# Patient Record
Sex: FEMALE | Race: WHITE | Employment: FULL TIME | URBAN - METROPOLITAN AREA
[De-identification: names, ages, dates, MRNs, and addresses within clinical notes are randomized per-mention and may not be internally consistent; named-entity substitution may affect disease eponyms.]

---

## 2017-03-17 ENCOUNTER — TRANSFERRED RECORDS (OUTPATIENT)
Dept: HEALTH INFORMATION MANAGEMENT | Facility: CLINIC | Age: 47
End: 2017-03-17

## 2017-03-17 LAB
CHOLEST SERPL-MCNC: 210 MG/DL (ref 100–199)
HDLC SERPL-MCNC: 68 MG/DL
LDLC SERPL CALC-MCNC: 119 MG/DL
NONHDLC SERPL-MCNC: 142 MG/DL
PAP-ABSTRACT: NORMAL
TRIGL SERPL-MCNC: 116 MG/DL

## 2017-09-17 ENCOUNTER — HEALTH MAINTENANCE LETTER (OUTPATIENT)
Age: 47
End: 2017-09-17

## 2018-10-21 ENCOUNTER — HOSPITAL ENCOUNTER (EMERGENCY)
Facility: CLINIC | Age: 48
Discharge: HOME | End: 2018-10-21
Attending: FAMILY MEDICINE | Admitting: FAMILY MEDICINE
Payer: COMMERCIAL

## 2018-10-21 ENCOUNTER — NURSE TRIAGE (OUTPATIENT)
Dept: NURSING | Facility: CLINIC | Age: 48
End: 2018-10-21

## 2018-10-21 VITALS
WEIGHT: 115 LBS | SYSTOLIC BLOOD PRESSURE: 137 MMHG | DIASTOLIC BLOOD PRESSURE: 83 MMHG | OXYGEN SATURATION: 100 % | HEART RATE: 96 BPM | TEMPERATURE: 97.4 F | RESPIRATION RATE: 16 BRPM

## 2018-10-21 DIAGNOSIS — L72.9 CYST OF SKIN: ICD-10-CM

## 2018-10-21 PROCEDURE — 99213 OFFICE O/P EST LOW 20 MIN: CPT | Mod: Z6 | Performed by: FAMILY MEDICINE

## 2018-10-21 PROCEDURE — G0463 HOSPITAL OUTPT CLINIC VISIT: HCPCS | Performed by: FAMILY MEDICINE

## 2018-10-21 RX ORDER — MINOCYCLINE HYDROCHLORIDE 100 MG/1
100 CAPSULE ORAL 2 TIMES DAILY
Qty: 28 CAPSULE | Refills: 0 | Status: SHIPPED | OUTPATIENT
Start: 2018-10-21 | End: 2018-11-04

## 2018-10-21 NOTE — ED AVS SNAPSHOT
[facility] Emergency Department    [address] [facility] [address]    Phone:  [phone]    Fax:  [phone]                                       Ebony Lobato   MRN: [id number]    Department:  [facility] Emergency Department   Date of Visit:  10/21/2018           Patient Information     Date Of Birth          1970        Your diagnoses for this visit were:     Cyst of skin        You were seen by Rudy Roy MD.        Discharge Instructions         Take prescribed medication as directed.    Warm pack the area occasionally.    Have a follow-up check with  if this is not improving over 2 weeks or come in sooner if it is worsening.    24 Hour Appointment Hotline       To make an appointment at any [facility] clinic, call [phone] ([phone]). If you don't have a family doctor or clinic, we will help you find one. [facility] clinics are conveniently located to serve the needs of you and your family.             Review of your medicines      START taking        Dose / Directions Last dose taken    minocycline 100 MG capsule   Commonly known as:  MINOCIN/DYNACIN   Dose:  100 mg   Quantity:  28 capsule        Take 1 capsule (100 mg) by mouth 2 times daily for 14 days   Refills:  0                Prescriptions were sent or printed at these locations (1 Prescription)                   [facility] [address] [place] [address]   [address] [facility] [address]    Telephone:  [phone]   Fax:  [phone]   Hours:                  E-Prescribed (1 of 1)         minocycline (MINOCIN/DYNACIN) 100 MG capsule                Orders Needing Specimen Collection     None      Pending Results     No orders found from 10/19/2018 to 10/22/2018.            Pending Culture Results     No orders found from 10/19/2018 to 10/22/2018.            Pending Results Instructions     If you had any lab results that were not finalized at the time of your Discharge, you  can call the ED Lab Result RN at [phone]. You will be contacted by this team for any positive Lab results or changes in treatment. The nurses are available 7 days a week from 10A to 6:30P.  You can leave a message 24 hours per day and they will return your call.        Test Results From Your Hospital Stay               Thank you for choosing [facility]       Thank you for choosing [facility] for your care. Our goal is always to provide you with excellent care. Hearing back from our patients is one way we can continue to improve our services. Please take a few minutes to complete the written survey that you may receive in the mail after you visit with us. Thank you!        [employer] Information     [employer] gives you secure access to your electronic health record. If you see a primary care provider, you can also send messages to your care team and make appointments. If you have questions, please call your primary care clinic.  If you do not have a primary care provider, please call [phone] and they will assist you.        Care EveryWhere ID     This is your Care EveryWhere ID. This could be used by other organizations to access your [facility] medical records  [id number]        Equal Access to Services     CHRISTY WHEELER : Hadesdras Craig, micah younger, jillian ritter. So [facility] [phone].    ATENCIÓN: Si habla español, tiene a medina disposición servicios gratuitos de asistencia lingüística. Eliane al [phone].    We comply with applicable federal civil rights laws and [place] laws. We do not discriminate on the basis of race, color, national origin, age, disability, sex, sexual orientation, or gender identity.            After Visit Summary       This is your record. Keep this with you and show to your community pharmacist(s) and doctor(s) at your next visit.

## 2018-10-21 NOTE — ED AVS SNAPSHOT
[facility] Emergency Department    [address] [facility] [address]    Phone:  [phone]    Fax:  [phone]                                       Ebony Lobato   MRN: [id number]    Department:  [facility] Emergency Department   Date of Visit:  10/21/2018           After Visit Summary Signature Page     I have received my discharge instructions, and my questions have been answered. I have discussed any challenges I see with this plan with the nurse or doctor.    ..........................................................................................................................................  Patient/Patient Representative Signature      ..........................................................................................................................................  Patient Representative Print Name and Relationship to Patient    ..................................................               ................................................  Date                                   Time    ..........................................................................................................................................  Reviewed by Signature/Title    ...................................................              ..............................................  Date                                               Time          22EPIC Rev 08/18

## 2018-10-21 NOTE — TELEPHONE ENCOUNTER
"  Reason for Disposition    Boil > 2 inches across (> 5 cm; larger than a golf ball or ping pong ball)    Additional Information    Negative: [1] Widespread rash AND [2] bright red, sunburn-like AND [3] too weak to stand    Negative: Sounds like a life-threatening emergency to the triager    Negative: Painful lump or swelling at opening to anus (rectum)    Negative: Painful lump or swelling at opening to vagina (on labia)    Negative: Painful lump or swelling on scrotum    Negative: Impetigo suspected or diagnosed    Negative: Doesn't match the SYMPTOMS of a boil    Negative: MRSA, questions about (No boil or other skin lesion)    Negative: Widespread red rash    Negative: Black (necrotic) color or blisters develop in wound    Negative: Patient sounds very sick or weak to the triager    Negative: SEVERE pain (e.g., excruciating)    Negative: Red streak from area of infection    Negative: Fever > 100.5 F (38.1 C)    Answer Assessment - Initial Assessment Questions  1. APPEARANCE of BOIL: \"What does the boil look like?\"       Hard and red  2. LOCATION: \"Where is the boil located?\"       chin  3. NUMBER: \"How many boils are there?\"       one  4. SIZE: \"How big is the boil?\" (e.g., inches, cm; compare to size of a coin or other object)      Her whole chin  5. ONSET: \"When did the boil start?\"      Four days ago  6. PAIN: \"Is there any pain?\" If so, ask: \"How bad is the pain?\"   (Scale 1-10; or mild, moderate, severe)      mild  7. FEVER: \"Do you have a fever?\" If so, ask: \"What is it, how was it measured, and when did it start?\"       no  8. SOURCE: \"Have you been around anyone with boils or other Staph infections?\" \"Have you ever had boils before?\"      no  9. OTHER SYMPTOMS: \"Do you have any other symptoms?\" (e.g., shaking chills, weakness, rash elsewhere on body)      no  10. PREGNANCY: \"Is there any chance you are pregnant?\" \"When was your last menstrual period?\"        no    Protocols used: BOIL (SKIN " ABSCESS)-ADULT-AH

## 2018-10-22 NOTE — DISCHARGE INSTRUCTIONS
Take prescribed medication as directed.    Warm pack the area occasionally.    Have a follow-up check with  if this is not improving over 2 weeks or come in sooner if it is worsening.

## 2018-10-22 NOTE — ED PROVIDER NOTES
History     Chief Complaint   Patient presents with     Cyst     boil on face near jaw.      HPI  Ebony Lobato is a 48 year old female who has an area of swelling and tenderness lower left cheek.    Ebony has had a problem with this for about 4 days.  She was out of town and would like to have it checked.  She has a firm area which she can feel right along the left mandible.  It has been tender and feels kind of like a cyst.  It was a little bit bigger a day or so ago and has quieted down a bit.  There has been no redness or drainage.    She does have a past history of acne and gets occasional pimples.      Problem List:    There are no active problems to display for this patient.       Past Medical History:    No past medical history on file.    Past Surgical History:    No past surgical history on file.    Family History:    No family history on file.    Social History:  Marital Status:   [2]  Social History   Substance Use Topics     Smoking status: Not on file     Smokeless tobacco: Not on file     Alcohol use Not on file        Medications:      minocycline (MINOCIN/DYNACIN) 100 MG capsule         Review of Systems    No fever or chills.  No head congestion or sore throat.  No cough or S OB.        Physical Exam   BP: 137/83  Pulse: 96  Temp: 97.4  F (36.3  C)  Resp: 16  Weight: 52.2 kg (115 lb)  SpO2: 100 %      Physical Exam    HEENT.  Tympanic membranes and pharynx normal.  Subcutaneous nodule about 2 cm diameter, flat location anterior left mandibular region.  No redness or drainage.  Neck.  No adenopathy or thyromegaly.        ED Course     ED Course     Procedures               Critical Care time:  none               No results found for this or any previous visit (from the past 24 hour(s)).    Medications - No data to display    Assessments & Plan (with Medical Decision Making)     This lesion feels like an acne cyst.  I started antibiotic.  She is advised to have follow-up if this does  not resolve.  She voices understanding of recommended treatment.        I have reviewed the nursing notes.    I have reviewed the findings, diagnosis, plan and need for follow up with the patient.       Discharge Medication List as of 10/21/2018  7:06 PM      START taking these medications    Details   minocycline (MINOCIN/DYNACIN) 100 MG capsule Take 1 capsule (100 mg) by mouth 2 times daily for 14 days, Disp-28 capsule, R-0, E-Prescribe             Final diagnoses:   Cyst of skin       10/21/2018   [facility] EMERGENCY DEPARTMENT     Rudy Roy MD  10/21/18 1478

## 2018-11-02 ENCOUNTER — NURSE TRIAGE (OUTPATIENT)
Dept: NURSING | Facility: CLINIC | Age: 48
End: 2018-11-02

## 2018-11-03 NOTE — TELEPHONE ENCOUNTER
"Pt was seen 10/21 at  for an acne cyst on chin and was prescribed minocycline x14 days to take through 11/3. Last day of abx would be tomorrow. Yesterday pt c/o pain in knee joints and yesterday evening pain in hands and wrists. She read that this can be a side effect of the med so she decided to stop taking it. The cyst cleared up several days ago. Denies breathing or swallowing difficulty. No rash or hives. She stopped minocycline 24h ago and joint pain continues. Not worse or better. She has swollen lymph node on L side of neck and mild sore throat but says she knows she is not sick because \"I never get those sx when I get sick\". Advised pt be seen tomorrow at  if joint pain does not improve. Pt voiced understanding and agreement. Ludmila Fountain RN/FNA      Reason for Disposition    Caller has NON-URGENT medication question about med that PCP prescribed and triager unable to answer question    Additional Information    Negative: Drug overdose and nurse unable to answer question    Negative: Caller requesting information not related to medicine    Negative: Caller requesting a prescription for Strep throat and has a positive culture result    Negative: Rash while taking a medication or within 3 days of stopping it    Negative: Immunization reaction suspected    Negative: [1] Asthma and [2] having symptoms of asthma (cough, wheezing, etc)    Negative: MORE THAN A DOUBLE DOSE of a prescription or over-the-counter (OTC) drug    Negative: [1] DOUBLE DOSE (an extra dose or lesser amount) of over-the-counter (OTC) drug AND [2] any symptoms (e.g., dizziness, nausea, pain, sleepiness)    Negative: [1] DOUBLE DOSE (an extra dose or lesser amount) of prescription drug AND [2] any symptoms (e.g., dizziness, nausea, pain, sleepiness)    Negative: Took another person's prescription drug    Negative: [1] DOUBLE DOSE (an extra dose or lesser amount) of prescription drug AND [2] NO symptoms (Exception: a double dose of " "antibiotics)    Negative: Diabetes drug error or overdose (e.g., insulin or extra dose)    Negative: [1] Request for URGENT new prescription or refill of \"essential\" medication (i.e., likelihood of harm to patient if not taken) AND [2] triager unable to fill per unit policy    Negative: [1] Prescription not at pharmacy AND [2] was prescribed today by PCP    Negative: Pharmacy calling with prescription questions and triager unable to answer question    Negative: Caller has URGENT medication question about med that PCP prescribed and triager unable to answer question    Protocols used: MEDICATION QUESTION CALL-ADULT-    "

## 2018-11-05 ENCOUNTER — OFFICE VISIT (OUTPATIENT)
Dept: FAMILY MEDICINE | Facility: CLINIC | Age: 48
End: 2018-11-05
Payer: COMMERCIAL

## 2018-11-05 VITALS
WEIGHT: 117.8 LBS | HEART RATE: 101 BPM | OXYGEN SATURATION: 99 % | TEMPERATURE: 95.5 F | SYSTOLIC BLOOD PRESSURE: 127 MMHG | DIASTOLIC BLOOD PRESSURE: 73 MMHG

## 2018-11-05 DIAGNOSIS — R53.81 MALAISE: Primary | ICD-10-CM

## 2018-11-05 DIAGNOSIS — M25.50 PAIN IN JOINT, MULTIPLE SITES: ICD-10-CM

## 2018-11-05 DIAGNOSIS — R07.0 THROAT PAIN: ICD-10-CM

## 2018-11-05 LAB
ALBUMIN SERPL-MCNC: 3.5 G/DL (ref 3.4–5)
ALP SERPL-CCNC: 73 U/L (ref 40–150)
ALT SERPL W P-5'-P-CCNC: 56 U/L (ref 0–50)
ANION GAP SERPL CALCULATED.3IONS-SCNC: 8 MMOL/L (ref 3–14)
AST SERPL W P-5'-P-CCNC: 51 U/L (ref 0–45)
BILIRUB SERPL-MCNC: 0.4 MG/DL (ref 0.2–1.3)
BUN SERPL-MCNC: 4 MG/DL (ref 7–30)
CALCIUM SERPL-MCNC: 8 MG/DL (ref 8.5–10.1)
CHLORIDE SERPL-SCNC: 102 MMOL/L (ref 94–109)
CO2 SERPL-SCNC: 26 MMOL/L (ref 20–32)
CREAT SERPL-MCNC: 0.48 MG/DL (ref 0.52–1.04)
DEPRECATED S PYO AG THROAT QL EIA: NORMAL
ERYTHROCYTE [DISTWIDTH] IN BLOOD BY AUTOMATED COUNT: 15.8 % (ref 10–15)
FLUAV+FLUBV AG SPEC QL: NEGATIVE
FLUAV+FLUBV AG SPEC QL: NEGATIVE
GFR SERPL CREATININE-BSD FRML MDRD: >90 ML/MIN/1.7M2
GLUCOSE SERPL-MCNC: 102 MG/DL (ref 70–99)
HCT VFR BLD AUTO: 32.8 % (ref 35–47)
HETEROPH AB SER QL: NEGATIVE
HGB BLD-MCNC: 10.5 G/DL (ref 11.7–15.7)
MCH RBC QN AUTO: 26 PG (ref 26.5–33)
MCHC RBC AUTO-ENTMCNC: 32 G/DL (ref 31.5–36.5)
MCV RBC AUTO: 81 FL (ref 78–100)
PLATELET # BLD AUTO: 153 10E9/L (ref 150–450)
POTASSIUM SERPL-SCNC: 3.7 MMOL/L (ref 3.4–5.3)
PROT SERPL-MCNC: 7.4 G/DL (ref 6.8–8.8)
RBC # BLD AUTO: 4.04 10E12/L (ref 3.8–5.2)
SODIUM SERPL-SCNC: 136 MMOL/L (ref 133–144)
SPECIMEN SOURCE: NORMAL
SPECIMEN SOURCE: NORMAL
WBC # BLD AUTO: 5.9 10E9/L (ref 4–11)

## 2018-11-05 PROCEDURE — 87804 INFLUENZA ASSAY W/OPTIC: CPT | Performed by: FAMILY MEDICINE

## 2018-11-05 PROCEDURE — 99214 OFFICE O/P EST MOD 30 MIN: CPT | Performed by: FAMILY MEDICINE

## 2018-11-05 PROCEDURE — 36415 COLL VENOUS BLD VENIPUNCTURE: CPT | Performed by: FAMILY MEDICINE

## 2018-11-05 PROCEDURE — 87880 STREP A ASSAY W/OPTIC: CPT | Performed by: FAMILY MEDICINE

## 2018-11-05 PROCEDURE — 86618 LYME DISEASE ANTIBODY: CPT | Performed by: FAMILY MEDICINE

## 2018-11-05 PROCEDURE — 86308 HETEROPHILE ANTIBODY SCREEN: CPT | Performed by: FAMILY MEDICINE

## 2018-11-05 PROCEDURE — 87081 CULTURE SCREEN ONLY: CPT | Performed by: FAMILY MEDICINE

## 2018-11-05 PROCEDURE — 85027 COMPLETE CBC AUTOMATED: CPT | Performed by: FAMILY MEDICINE

## 2018-11-05 PROCEDURE — 80053 COMPREHEN METABOLIC PANEL: CPT | Performed by: FAMILY MEDICINE

## 2018-11-05 RX ORDER — DICLOFENAC SODIUM 75 MG/1
75 TABLET, DELAYED RELEASE ORAL 2 TIMES DAILY PRN
Qty: 30 TABLET | Refills: 1 | Status: SHIPPED | OUTPATIENT
Start: 2018-11-05

## 2018-11-05 NOTE — MR AVS SNAPSHOT
"              After Visit Summary   11/5/2018    Ebony Lobato    MRN: [id number]           Patient Information     Date Of Birth          1970        Visit Information        Provider Department      11/5/2018 11:30 AM Lou Soto MD [facility]        Today's Diagnoses     Malaise    -  1    Pain in joint, multiple sites        Throat pain          Care Instructions      Viral Syndrome (Adult)  A viral illness may cause a number of symptoms. The symptoms depend on the part of the body that the virus affects. If it settles in your nose, throat, and lungs, it may cause cough, sore throat, congestion, and sometimes headache. If it settles in your stomach and intestinal tract, it may cause vomiting and diarrhea. Sometimes it causes vague symptoms like \"aching all over,\" feeling tired, loss of appetite, or fever.  A viral illness usually lasts 1 to 2 weeks, but sometimes it lasts longer. In some cases, a more serious infection can look like a viral syndrome in the first few days of the illness. You may need another exam and additional tests to know the difference. Watch for the warning signs listed below.  Home care  Follow these guidelines for taking care of yourself at home:    If symptoms are severe, rest at home for the first 2 to 3 days.    Stay away from cigarette smoke - both your smoke and the smoke from others.    You may use over-the-counter acetaminophen or ibuprofen for fever, muscle aching, and headache, unless another medicine was prescribed for this. If you have chronic liver or kidney disease or ever had a stomach ulcer or GI bleeding, talk with your doctor before using these medicines. No one who is younger than 18 and ill with a fever should take aspirin. It may cause severe disease or death.    Your appetite may be poor, so a light diet is fine. Avoid dehydration by drinking 8 to 12 8-ounce glasses of fluids each day. This may include water; orange juice; lemonade; " apple, grape, and cranberry juice; clear fruit drinks; electrolyte replacement and sports drinks; and decaffeinated teas and coffee. If you have been diagnosed with a kidney disease, ask your doctor how much and what types of fluids you should drink to prevent dehydration. If you have kidney disease, drinking too much fluid can cause it build up in the your body and be dangerous to your health.    Over-the-counter remedies won't shorten the length of the illness but may be helpful for cough, sore throat; and nasal and sinus congestion. Don't use decongestants if you have high blood pressure.  Follow-up care  Follow up with your healthcare provider if you do not improve over the next week.  Call 911  Call 911 if any of the following occur:    Convulsion    Feeling weak, dizzy, or like you are going to faint    Chest pain, shortness of breath, wheezing, or difficulty breathing  When to seek medical advice  Call your healthcare provider right away if any of these occur:    Cough with lots of colored sputum (mucus) or blood in your sputum    Chest pain, shortness of breath, wheezing, or difficulty breathing    Severe headache; face, neck, or ear pain    Severe, constant pain in the lower right side of your belly (abdominal)    Continued vomiting (can t keep liquids down)    Frequent diarrhea (more than 5 times a day); blood (red or black color) or mucus in diarrhea    Feeling weak, dizzy, or like you are going to faint    Extreme thirst    Fever of 100.4 F (38 C) or higher, or as directed by your healthcare provider  Date Last Reviewed: 9/25/2015 2000-2017 [employer]. [address] [place] [address], [place] [address]. All rights reserved. This information is not intended as a substitute for professional medical care. Always follow your healthcare professional's instructions.                Follow-ups after your visit        Follow-up notes from your care team     Return if symptoms worsen or fail to improve.       Who to contact     Normal or non-critical lab and imaging results will be communicated to you by [employer], letter or phone within 4 business days after the clinic has received the results. If you do not hear from us within 7 days, please contact the clinic through [employer] or phone. If you have a critical or abnormal lab result, we will notify you by phone as soon as possible.  Submit refill requests through [employer] or call your pharmacy and they will forward the refill request to us. Please allow 3 business days for your refill to be completed.          If you need to speak with a  for additional information , please call: [phone]             Additional Information About Your Visit        [employer] Information     [employer] gives you secure access to your electronic health record. If you see a primary care provider, you can also send messages to your care team and make appointments. If you have questions, please call your primary care clinic.  If you do not have a primary care provider, please call [phone] and they will assist you.        Care EveryWhere ID     This is your Care EveryWhere ID. This could be used by other organizations to access your [facility] medical records  [id number]        Your Vitals Were     Pulse Temperature Pulse Oximetry             101 95.5  F (35.3  C) (Tympanic) 99%          Blood Pressure from Last 3 Encounters:   11/05/18 127/73   10/21/18 137/83    Weight from Last 3 Encounters:   11/05/18 117 lb 12.8 oz (53.4 kg)   10/21/18 115 lb (52.2 kg)              We Performed the Following     Beta strep group A culture     CBC with platelets     Comprehensive metabolic panel     Influenza A/B antigen     Lyme Disease Arabella with reflex to WB Serum     Mononucleosis screen     Strep, Rapid Screen          Today's Medication Changes          These changes are accurate as of 11/5/18 12:10 PM.  If you have any questions, ask your nurse or doctor.               Start  taking these medicines.        Dose/Directions    diclofenac 75 MG EC tablet   Commonly known as:  VOLTAREN   Used for:  Pain in joint, multiple sites        Dose:  75 mg   Take 1 tablet (75 mg) by mouth 2 times daily as needed for moderate pain (take with food)   Quantity:  30 tablet   Refills:  1            Where to get your medicines      These medications were sent to [facility] [place] [facility] - [address] [facility]  [address] [facility] [address] [place] [address]     Phone:  [phone]     diclofenac 75 MG EC tablet                Primary Care Provider Office Phone # Fax #    [facility] [phone] [address] [facility] [address]        Equal Access to Services     CHRISTY WHEELER : Halina haskinso Sochicho, waaxda luqadaha, qaybta kaalmada adejean claudeyada, jillian blair. So [facility] [phone].    ATENCIÓN: Si habla español, tiene a medina disposición servicios gratuitos de asistencia lingüística. Llame al [phone].    We comply with applicable federal civil rights laws and [place] laws. We do not discriminate on the basis of race, color, national origin, age, disability, sex, sexual orientation, or gender identity.            Thank you!     Thank you for choosing [facility]  for your care. Our goal is always to provide you with excellent care. Hearing back from our patients is one way we can continue to improve our services. Please take a few minutes to complete the written survey that you may receive in the mail after your visit with us. Thank you!             Your Updated Medication List - Protect others around you: Learn how to safely use, store and throw away your medicines at www.disposemymeds.org.          This list is accurate as of 11/5/18 12:10 PM.  Always use your most recent med list.                   Brand Name Dispense Instructions for use Diagnosis    diclofenac 75 MG EC tablet    VOLTAREN    30 tablet     Take 1 tablet (75 mg) by mouth 2 times daily as needed for moderate pain (take with food)    Pain in joint, multiple sites

## 2018-11-05 NOTE — PATIENT INSTRUCTIONS

## 2018-11-05 NOTE — PROGRESS NOTES
SUBJECTIVE:   Ebony Lobato is a 48 year old female who presents to clinic today for the following health issues:      ED/UC Followup:    Facility:  [facility]  Date of visit: 11/3/18  Reason for visit: body aches  Current Status: Still having joint pains in hands, knees, and arms (worsens at night and in the morning)- improves when getting up and walking around. Reporting temp 100.6 last night with sore throat.  Having some tenderness in upper left abdomen that recently began     States that she is otherwise healthy without any known chronic medical problems.   Reports general malaise x 4 days with multiple joint pains, sore throat, fever. No cough or runny nose. No chest pain, shortness of breath, vomiting or diarrhea. No rash.     Recently completed a 2 week course of Minocycline for an acne cyst on the left lower cheek that has since resolved. Initially thought symptoms were due to a drug reaction. Reports that last night she had a temp of 100.6    Recent records from ER (10/21) and Urgent care (11/3) reviewed.     Problem list and histories reviewed & adjusted, as indicated.  Additional history: as documented    Patient Active Problem List   Diagnosis     Pain in joint, multiple sites     Past Surgical History:   Procedure Laterality Date     NO HISTORY OF SURGERY         Social History   Substance Use Topics     Smoking status: Never Smoker     Smokeless tobacco: Not on file     Alcohol use No     History reviewed. No pertinent family history.      Current Outpatient Prescriptions   Medication Sig Dispense Refill     diclofenac (VOLTAREN) 75 MG EC tablet Take 1 tablet (75 mg) by mouth 2 times daily as needed for moderate pain (take with food) 30 tablet 1     No Known Allergies    Reviewed and updated as needed this visit by clinical staff  Tobacco  Allergies  Meds  Problems  Med Hx  Surg Hx  Fam Hx  Soc Hx        Reviewed and updated as needed this visit by Provider  Tobacco   Allergies  Meds  Problems  Med Hx  Surg Hx  Fam Hx  Soc Hx          ROS:  All others are negative except as above.      OBJECTIVE:     /73  Pulse 101  Temp 95.5  F (35.3  C) (Tympanic)  Wt 117 lb 12.8 oz (53.4 kg)  SpO2 99%  There is no height or weight on file to calculate BMI.  GENERAL: healthy, alert and no distress  EYES: Eyes grossly normal to inspection, PERRL and conjunctivae and sclerae normal  HENT: ear canals and TM's normal, nose and mouth without ulcers or lesions  NECK: left sided adenopathy, no asymmetry, masses, or scars and thyroid normal to palpation  RESP: lungs clear to auscultation - no rales, rhonchi or wheezes  CV: regular rate and rhythm, normal S1 S2, no S3 or S4, no murmur, click or rub, no peripheral edema and peripheral pulses strong  ABDOMEN: soft, mild left upper quadrant tenderness, no hepatosplenomegaly, no masses and bowel sounds normal  PSYCH: mentation appears normal, affect normal/bright    Diagnostic Test Results:  Reviewed and discussed with patient prior to discharge.  Results for orders placed or performed in visit on 11/05/18   Mononucleosis screen   Result Value Ref Range    Mononucleosis Screen Negative NEG^Negative   CBC with platelets   Result Value Ref Range    WBC 5.9 4.0 - 11.0 10e9/L    RBC Count 4.04 3.8 - 5.2 10e12/L    Hemoglobin 10.5 (L) 11.7 - 15.7 g/dL    Hematocrit 32.8 (L) 35.0 - 47.0 %    MCV 81 78 - 100 fl    MCH 26.0 (L) 26.5 - 33.0 pg    MCHC 32.0 31.5 - 36.5 g/dL    RDW 15.8 (H) 10.0 - 15.0 %    Platelet Count 153 150 - 450 10e9/L   Strep, Rapid Screen   Result Value Ref Range    Specimen Description Throat     Rapid Strep A Screen       NEGATIVE: No Group A streptococcal antigen detected by immunoassay, await culture report.   Influenza A/B antigen   Result Value Ref Range    Influenza A/B Agn Specimen Nasal     Influenza A Negative NEG^Negative    Influenza B Negative NEG^Negative         ASSESSMENT/PLAN:   Ebony was seen today for  pharyngitis.    Diagnoses and all orders for this visit:    Malaise with Pain in joint, multiple sites , likely a viral syndrome. Concerned about the severity of the symptoms. Will rule out other possible etiolgies  -     Influenza A/B antigen  -     Mononucleosis screen  -     CBC with platelets  -     Comprehensive metabolic panel  -     Lyme Disease Arabella with reflex to WB Serum  -     diclofenac (VOLTAREN) 75 MG EC tablet; Take 1 tablet (75 mg) by mouth 2 times daily as needed for moderate pain (take with food)    Throat pain  -     Strep, Rapid Screen  -     Beta strep group A culture  Throat lozenges or sprays (such as Chloraseptic) help reduce pain. Gargling with warm salt water will also reduce throat pain. Dissolve 1/2 teaspoon of salt in 1 glass of warm water. This may be useful just before meals.    Will notify her with results. In the interim manage as a Viral syndrome  Patient education and Handout given       Follow up if symptoms fail to improve or worsen.      The patient was in agreement with the plan today and had no questions or concerns prior to leaving the clinic.    Schedule a Physical Exam at earliest convenience.     Lou Soto MD  [facility]

## 2018-11-06 LAB
B BURGDOR IGG+IGM SER QL: 0.25 (ref 0–0.89)
BACTERIA SPEC CULT: NORMAL
SPECIMEN SOURCE: NORMAL

## 2020-02-23 ENCOUNTER — HEALTH MAINTENANCE LETTER (OUTPATIENT)
Age: 50
End: 2020-02-23

## 2020-12-06 ENCOUNTER — HEALTH MAINTENANCE LETTER (OUTPATIENT)
Age: 50
End: 2020-12-06

## 2021-02-20 ENCOUNTER — HEALTH MAINTENANCE LETTER (OUTPATIENT)
Age: 51
End: 2021-02-20

## 2021-04-11 ENCOUNTER — HEALTH MAINTENANCE LETTER (OUTPATIENT)
Age: 51
End: 2021-04-11

## 2021-09-25 ENCOUNTER — HEALTH MAINTENANCE LETTER (OUTPATIENT)
Age: 51
End: 2021-09-25

## 2022-03-12 ENCOUNTER — HEALTH MAINTENANCE LETTER (OUTPATIENT)
Age: 52
End: 2022-03-12

## 2022-05-07 ENCOUNTER — HEALTH MAINTENANCE LETTER (OUTPATIENT)
Age: 52
End: 2022-05-07

## 2023-04-22 ENCOUNTER — HEALTH MAINTENANCE LETTER (OUTPATIENT)
Age: 53
End: 2023-04-22

## 2023-06-02 ENCOUNTER — HEALTH MAINTENANCE LETTER (OUTPATIENT)
Age: 53
End: 2023-06-02